# Patient Record
Sex: FEMALE | Race: WHITE | Employment: UNEMPLOYED | URBAN - METROPOLITAN AREA
[De-identification: names, ages, dates, MRNs, and addresses within clinical notes are randomized per-mention and may not be internally consistent; named-entity substitution may affect disease eponyms.]

---
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
## 2023-01-21 ENCOUNTER — HOSPITAL ENCOUNTER (OUTPATIENT)
Age: 18
Discharge: HOME | End: 2023-01-21
Payer: COMMERCIAL

## 2023-01-21 PROCEDURE — 84144 ASSAY OF PROGESTERONE: CPT

## 2023-01-23 ENCOUNTER — HOSPITAL ENCOUNTER (OUTPATIENT)
Age: 18
Discharge: HOME | End: 2023-01-23
Payer: COMMERCIAL

## 2023-01-23 PROCEDURE — 84144 ASSAY OF PROGESTERONE: CPT

## 2023-01-25 LAB — PROGESTERONE LEVEL: 9 NG/ML

## 2023-03-18 ENCOUNTER — HOSPITAL ENCOUNTER (OUTPATIENT)
Age: 18
Discharge: HOME | End: 2023-03-18
Payer: COMMERCIAL

## 2023-03-18 PROCEDURE — 84144 ASSAY OF PROGESTERONE: CPT

## 2023-03-20 ENCOUNTER — HOSPITAL ENCOUNTER (OUTPATIENT)
Age: 18
Discharge: HOME | End: 2023-03-20
Payer: COMMERCIAL

## 2023-03-20 PROCEDURE — 84144 ASSAY OF PROGESTERONE: CPT

## 2023-03-21 LAB — PROGEST SERPL-MCNC: 11.19 NG/ML

## 2023-03-22 LAB — PROGEST SERPL-MCNC: 14.3 NG/ML
